# Patient Record
Sex: FEMALE | Race: BLACK OR AFRICAN AMERICAN | NOT HISPANIC OR LATINO | URBAN - METROPOLITAN AREA
[De-identification: names, ages, dates, MRNs, and addresses within clinical notes are randomized per-mention and may not be internally consistent; named-entity substitution may affect disease eponyms.]

---

## 2024-03-05 ENCOUNTER — HOSPITAL ENCOUNTER (EMERGENCY)
Facility: HOSPITAL | Age: 34
Discharge: HOME | End: 2024-03-05
Payer: COMMERCIAL

## 2024-03-05 VITALS
RESPIRATION RATE: 17 BRPM | HEIGHT: 66 IN | SYSTOLIC BLOOD PRESSURE: 124 MMHG | BODY MASS INDEX: 22.5 KG/M2 | DIASTOLIC BLOOD PRESSURE: 84 MMHG | OXYGEN SATURATION: 98 % | HEART RATE: 91 BPM | TEMPERATURE: 99 F | WEIGHT: 140 LBS

## 2024-03-05 DIAGNOSIS — Z20.2 POSSIBLE EXPOSURE TO STD: ICD-10-CM

## 2024-03-05 DIAGNOSIS — N76.0 BACTERIAL VAGINOSIS: ICD-10-CM

## 2024-03-05 DIAGNOSIS — N64.4 BREAST TENDERNESS IN FEMALE: Primary | ICD-10-CM

## 2024-03-05 DIAGNOSIS — B96.89 BACTERIAL VAGINOSIS: ICD-10-CM

## 2024-03-05 LAB
APPEARANCE UR: ABNORMAL
BILIRUB UR STRIP.AUTO-MCNC: NEGATIVE MG/DL
CLUE CELLS SPEC QL WET PREP: PRESENT
COLOR UR: YELLOW
GLUCOSE UR STRIP.AUTO-MCNC: NEGATIVE MG/DL
HCG UR QL IA.RAPID: NEGATIVE
KETONES UR STRIP.AUTO-MCNC: NEGATIVE MG/DL
LEUKOCYTE ESTERASE UR QL STRIP.AUTO: ABNORMAL
MUCOUS THREADS #/AREA URNS AUTO: NORMAL /LPF
NITRITE UR QL STRIP.AUTO: NEGATIVE
PH UR STRIP.AUTO: 7 [PH]
PROT UR STRIP.AUTO-MCNC: NEGATIVE MG/DL
RBC # UR STRIP.AUTO: NEGATIVE /UL
RBC #/AREA URNS AUTO: NORMAL /HPF
SP GR UR STRIP.AUTO: 1.01
SQUAMOUS #/AREA URNS AUTO: NORMAL /HPF
T VAGINALIS SPEC QL WET PREP: ABNORMAL
TRICHOMONAS REFLEX COMMENT: ABNORMAL
UROBILINOGEN UR STRIP.AUTO-MCNC: <2 MG/DL
WBC #/AREA URNS AUTO: NORMAL /HPF
WBC VAG QL WET PREP: ABNORMAL
YEAST VAG QL WET PREP: ABNORMAL

## 2024-03-05 PROCEDURE — 99283 EMERGENCY DEPT VISIT LOW MDM: CPT

## 2024-03-05 PROCEDURE — 2500000001 HC RX 250 WO HCPCS SELF ADMINISTERED DRUGS (ALT 637 FOR MEDICARE OP): Performed by: NURSE PRACTITIONER

## 2024-03-05 PROCEDURE — 87389 HIV-1 AG W/HIV-1&-2 AB AG IA: CPT | Mod: AHULAB | Performed by: NURSE PRACTITIONER

## 2024-03-05 PROCEDURE — 96372 THER/PROPH/DIAG INJ SC/IM: CPT

## 2024-03-05 PROCEDURE — 87661 TRICHOMONAS VAGINALIS AMPLIF: CPT | Mod: 59,AHULAB | Performed by: NURSE PRACTITIONER

## 2024-03-05 PROCEDURE — 81025 URINE PREGNANCY TEST: CPT | Performed by: NURSE PRACTITIONER

## 2024-03-05 PROCEDURE — 81003 URINALYSIS AUTO W/O SCOPE: CPT | Performed by: NURSE PRACTITIONER

## 2024-03-05 PROCEDURE — 87086 URINE CULTURE/COLONY COUNT: CPT | Mod: AHULAB | Performed by: NURSE PRACTITIONER

## 2024-03-05 PROCEDURE — 87210 SMEAR WET MOUNT SALINE/INK: CPT | Mod: 59 | Performed by: NURSE PRACTITIONER

## 2024-03-05 PROCEDURE — 87800 DETECT AGNT MULT DNA DIREC: CPT | Mod: AHULAB | Performed by: NURSE PRACTITIONER

## 2024-03-05 PROCEDURE — 2500000004 HC RX 250 GENERAL PHARMACY W/ HCPCS (ALT 636 FOR OP/ED): Performed by: NURSE PRACTITIONER

## 2024-03-05 PROCEDURE — 86780 TREPONEMA PALLIDUM: CPT | Mod: AHULAB | Performed by: NURSE PRACTITIONER

## 2024-03-05 PROCEDURE — 36415 COLL VENOUS BLD VENIPUNCTURE: CPT | Performed by: NURSE PRACTITIONER

## 2024-03-05 RX ORDER — METRONIDAZOLE 500 MG/1
500 TABLET ORAL ONCE
Status: COMPLETED | OUTPATIENT
Start: 2024-03-05 | End: 2024-03-05

## 2024-03-05 RX ORDER — DOXYCYCLINE HYCLATE 100 MG
TABLET ORAL
Status: DISCONTINUED
Start: 2024-03-05 | End: 2024-03-06 | Stop reason: HOSPADM

## 2024-03-05 RX ORDER — CEFTRIAXONE 500 MG/1
INJECTION, POWDER, FOR SOLUTION INTRAMUSCULAR; INTRAVENOUS
Status: DISCONTINUED
Start: 2024-03-05 | End: 2024-03-06 | Stop reason: HOSPADM

## 2024-03-05 RX ORDER — DOXYCYCLINE 100 MG/1
100 TABLET ORAL 2 TIMES DAILY
Qty: 14 TABLET | Refills: 0 | Status: SHIPPED | OUTPATIENT
Start: 2024-03-05 | End: 2024-03-12

## 2024-03-05 RX ORDER — METRONIDAZOLE 500 MG/1
500 TABLET ORAL 2 TIMES DAILY
Qty: 14 TABLET | Refills: 0 | Status: SHIPPED | OUTPATIENT
Start: 2024-03-05 | End: 2024-03-12

## 2024-03-05 RX ORDER — METRONIDAZOLE 500 MG/1
TABLET ORAL
Status: DISCONTINUED
Start: 2024-03-05 | End: 2024-03-06 | Stop reason: HOSPADM

## 2024-03-05 RX ORDER — DOXYCYCLINE HYCLATE 100 MG
100 TABLET ORAL ONCE
Status: COMPLETED | OUTPATIENT
Start: 2024-03-05 | End: 2024-03-05

## 2024-03-05 RX ORDER — CEFTRIAXONE 500 MG/1
500 INJECTION, POWDER, FOR SOLUTION INTRAMUSCULAR; INTRAVENOUS ONCE
Status: COMPLETED | OUTPATIENT
Start: 2024-03-05 | End: 2024-03-05

## 2024-03-05 RX ADMIN — CEFTRIAXONE SODIUM 500 MG: 500 INJECTION, POWDER, FOR SOLUTION INTRAMUSCULAR; INTRAVENOUS at 22:12

## 2024-03-05 RX ADMIN — METRONIDAZOLE 500 MG: 500 TABLET ORAL at 22:12

## 2024-03-05 RX ADMIN — DOXYCYCLINE HYCLATE 100 MG: 100 TABLET, COATED ORAL at 22:12

## 2024-03-05 ASSESSMENT — COLUMBIA-SUICIDE SEVERITY RATING SCALE - C-SSRS
1. IN THE PAST MONTH, HAVE YOU WISHED YOU WERE DEAD OR WISHED YOU COULD GO TO SLEEP AND NOT WAKE UP?: NO
2. HAVE YOU ACTUALLY HAD ANY THOUGHTS OF KILLING YOURSELF?: NO
6. HAVE YOU EVER DONE ANYTHING, STARTED TO DO ANYTHING, OR PREPARED TO DO ANYTHING TO END YOUR LIFE?: NO

## 2024-03-05 ASSESSMENT — PAIN DESCRIPTION - DESCRIPTORS: DESCRIPTORS: ACHING;BURNING

## 2024-03-05 ASSESSMENT — PAIN - FUNCTIONAL ASSESSMENT: PAIN_FUNCTIONAL_ASSESSMENT: 0-10

## 2024-03-05 ASSESSMENT — PAIN DESCRIPTION - LOCATION: LOCATION: OTHER (COMMENT)

## 2024-03-05 ASSESSMENT — PAIN SCALES - GENERAL: PAINLEVEL_OUTOF10: 7

## 2024-03-05 ASSESSMENT — PAIN DESCRIPTION - ORIENTATION: ORIENTATION: RIGHT

## 2024-03-05 ASSESSMENT — PAIN DESCRIPTION - PAIN TYPE: TYPE: ACUTE PAIN

## 2024-03-06 LAB
BACTERIA UR CULT: NORMAL
C TRACH RRNA SPEC QL NAA+PROBE: NEGATIVE
HIV 1+2 AB+HIV1 P24 AG SERPL QL IA: NONREACTIVE
N GONORRHOEA DNA SPEC QL PROBE+SIG AMP: NEGATIVE
T VAGINALIS RRNA SPEC QL NAA+PROBE: NEGATIVE
TREPONEMA PALLIDUM IGG+IGM AB [PRESENCE] IN SERUM OR PLASMA BY IMMUNOASSAY: NONREACTIVE

## 2024-03-06 NOTE — ED PROVIDER NOTES
"Emergency Department Encounter  [facility] EMERGENCY MEDICINE    Patient: Latoya Reddy  MRN: [id number]  : 1990  Date of Evaluation: 3/5/2024  ED Provider: FREDERIC Frank      Chief Complaint       Chief Complaint   Patient presents with    Breast Pain     Pt to ED for right nipple pain without swelling. No discharge from the nipple. Pt also would like to be tested for STD as she was given a heads up. She does not have any symptoms.      [place]    (Location/Symptom, Timing/Onset, Context/Setting, Quality, Duration, Modifying Factors, Severity) Note limiting factors.   Limitations to History: none  Historian: self  Records reviewed: EMR inpatient and outpatient notes, Care Everywhere      Latoya Reddy is a 33 y.o. female who presents to the emergency department complaining of right breast pain at the nipple line with no swelling, erythema, discharge ongoing for the last few weeks.  Does report that she has a history of nipple piercings and is unsure if this has to do with her pain.  Also would like to get tested for STDs but all of them\".  States that her previous sexual partner was having STD symptoms with burning and discharge and patient would like to get tested.  Patient denies any vaginal bleeding, vaginal discharge.  Denies any rashes, lesions.  Denies any lightheadedness, dizziness, syncope.  Denies any chance of pregnancy, last menstrual period was .    ROS:     Review of Systems  14 systems reviewed and otherwise acutely negative except as in the [place].          Past History     Past Medical History:   Diagnosis Date    18 weeks gestation of pregnancy 10/08/2018    18 weeks gestation of pregnancy    22 weeks gestation of pregnancy 2018    22 weeks gestation of pregnancy    31 weeks gestation of pregnancy 2019    31 weeks gestation of pregnancy    33 weeks gestation of pregnancy 01/15/2019    33 weeks gestation of pregnancy    36 weeks gestation of pregnancy " 02/06/2019    36 weeks gestation of pregnancy    Allergy status to unspecified drugs, medicaments and biological substances     History of seasonal allergies    Anemia complicating pregnancy, unspecified trimester 08/10/2017    Anemia in pregnancy    Displacement of intrauterine contraceptive device, initial encounter 07/30/2015    IUD threads lost    Encounter for contraceptive management, unspecified 09/28/2017    Contraception management    Encounter for gynecological examination (general) (routine) without abnormal findings     Well woman exam with routine gynecological exam    Encounter for preprocedural laboratory examination     Pre-operative laboratory examination    Encounter for removal of intrauterine contraceptive device 11/11/2015    Encounter for IUD removal    Encounter for routine postpartum follow-up 06/09/2015    Follow-up, postpartum, routine    Encounter for screening for infections with a predominantly sexual mode of transmission     Screen for sexually transmitted diseases    Encounter for supervision of normal pregnancy, unspecified, first trimester 02/23/2017    Encounter for pregnancy related examination in first trimester    Encounter for supervision of normal pregnancy, unspecified, first trimester 08/10/2017    Encounter for pregnancy related examination in first trimester    Encounter for supervision of normal pregnancy, unspecified, second trimester 08/03/2017    Encounter for pregnancy related examination in second trimester    Encounter for supervision of normal pregnancy, unspecified, unspecified trimester 09/10/2018    Encounter for supervision of normal pregnancy    Encounter for supervision of other normal pregnancy, first trimester     Encounter for supervision of other normal pregnancy in first trimester    Encounter for supervision of other normal pregnancy, second trimester 11/05/2018    Prenatal care, subsequent pregnancy in second trimester    Encounter for supervision of  other normal pregnancy, third trimester 01/15/2019    Prenatal care, subsequent pregnancy, third trimester    Encounter for surveillance of injectable contraceptive 2016    Encounter for Depo-Provera contraception    History of uterine scar from previous surgery 2019    History of uterine scar from previous surgery    Irregular menstruation, unspecified 2018    Missed menses    Maternal care for breech presentation, fetus 1 2017    Breech presentation, fetus 1    Other conditions influencing health status 2015    History of dyspareunia    Personal history of other drug therapy 10/08/2018    History of influenza vaccination    Personal history of other specified conditions     History of urinary frequency     Past Surgical History:   Procedure Laterality Date    OTHER SURGICAL HISTORY  2019    Tubal ligation bilateral    OTHER SURGICAL HISTORY  2019     section low transverse     Social History     Socioeconomic History    Marital status: Single     Spouse name: Not on file    Number of children: Not on file    Years of education: Not on file    Highest education level: Not on file   Occupational History    Not on file   Tobacco Use    Smoking status: Not on file    Smokeless tobacco: Not on file   Substance and Sexual Activity    Alcohol use: Not on file    Drug use: Not on file    Sexual activity: Not on file   Other Topics Concern    Not on file   Social History Narrative    Not on file     Social Determinants of Health     Financial Resource Strain: Not on file   Food Insecurity: Not on file   Transportation Needs: Not on file   Physical Activity: Not on file   Stress: Not on file   Social Connections: Not on file   Intimate Partner Violence: Not on file   Housing Stability: Not on file       Medications/Allergies     Previous Medications    No medications on file     No Known Allergies     Physical Exam       ED Triage Vitals [24 1915]   Temperature Heart  Rate Respirations BP   37.2 °C (99 °F) 99 18 118/80      Pulse Ox Temp Source Heart Rate Source Patient Position   100 % Oral -- Sitting      BP Location FiO2 (%)     Right arm --         Physical Exam    GENERAL:  The patient appears nourished and normally developed. Vital signs as documented.     HEENT:  Head normocephalic, atraumatic, EOMs intact, PERRLA, Mucous membranes moist. Nares patent without copious rhinorrhea.  No lymphadenopathy.    PULMONARY:  Lungs are clear to auscultation, without any respiratory distress. Able to speak full sentences, no accessory muscle use    CARDIAC:   Normal rate. No murmurs, rubs or gallops    ABDOMEN:  Soft, non distended, non tender, BS positive x 4 quadrants, No rebound or guarding, no peritoneal signs, no CVA tenderness, no masses or organomegaly      MUSCULOSKELETAL:   Able to ambulate, Non edematous, with no obvious deformities. Pulses intact distal    SKIN:   Good color, with no significant rashes.  No pallor.  Right nipple areola with mild tenderness, no erythema, no nipple discharge, no areas of fluctuance, palpable firmness noted at the areola at the 9 o'clock position    NEURO:  No obvious neurological deficits, normal sensation and strength bilaterally.  Able to follow commands, NIH 0, CN 2-12 intact.        Diagnostics   Labs:  Labs Reviewed   TRICHOMONAS WET PREP REFLEX TO PCR - Abnormal       Result Value    Trichomonas None Seen      Clue Cells Present (*)     Yeast None Seen      WBC 1-2      Trichomonas reflex comment        Value: Trichomonas was not seen by wet prep. Reflex Trichomonas vaginalis by Amplified Detection.   URINALYSIS WITH REFLEX CULTURE AND MICROSCOPIC - Abnormal    Color, Urine Yellow      Appearance, Urine Hazy (*)     Specific Gravity, Urine 1.012      pH, Urine 7.0      Protein, Urine NEGATIVE      Glucose, Urine NEGATIVE      Blood, Urine NEGATIVE      Ketones, Urine NEGATIVE      Bilirubin, Urine NEGATIVE      Urobilinogen, Urine <2.0    "   Nitrite, Urine NEGATIVE      Leukocyte Esterase, Urine TRACE (*)    HCG, URINE, QUALITATIVE - Normal    HCG, Urine NEGATIVE     URINE CULTURE   URINALYSIS WITH REFLEX CULTURE AND MICROSCOPIC    Narrative:     The following orders were created for panel order Urinalysis with Reflex Culture and Microscopic.  Procedure                               Abnormality         Status                     ---------                               -----------         ------                     Urinalysis with Reflex C...[[id number]]  Abnormal            Final result               Extra Urine Gray Tube[[id number]]                                                         Please view results for these tests on the individual orders.   MICROSCOPIC ONLY, URINE    WBC, Urine 1-5      RBC, Urine 1-2      Squamous Epithelial Cells, Urine 1-9 (SPARSE)      Mucus, Urine 1+     SYPHILIS SCREENING WITH REFLEX   HIV 1/2 ANTIGEN/ANTIBODY SCREEN WIH REFLEX TO CONFIRMATION   C. TRACHOMATIS + N. GONORRHOEAE, AMPLIFIED   TRICH VAGINALIS, AMPLIFIED     Radiographs:  No orders to display             Assessment   In brief, Latoya Reddy is a 33 y.o. female who presented to the emergency department for nipple pain as well as STD testing    Plan   Lab work    Differentials   Abscess  Cellulitis  Scar tissue  Cyst  Malignancy    ED Course     Diagnoses as of 03/05/24 2146   Breast tenderness in female   Bacterial vaginosis   Possible exposure to STD       Visit Vitals  /80 (BP Location: Right arm, Patient Position: Sitting)   Pulse 99   Temp 37.2 °C (99 °F) (Oral)   Resp 18   Ht 1.676 m (5' 6\")   Wt 63.5 kg (140 lb)   SpO2 100%   BMI 22.60 kg/m²   OB Status Having periods   BSA 1.72 m²       Medications   cefTRIAXone (Rocephin) vial 500 mg (has no administration in time range)   doxycycline (Vibra-Tabs) tablet 100 mg (has no administration in time range)   metroNIDAZOLE (Flagyl) tablet 500 mg (has no administration in time range)       Plan of care " discussed, patient is stable appearing, no signs of infectious etiology to the right breast but does have tenderness and flesh-colored area of firmness at the area of the lateral aspect of the areola on the right side.  Patient is concerned about exposure to STD, does not want the IM injection but will take the oral antibiotics that we will treat for chlamydia, doxycycline will also treat possible skin infection and may help with the breast tenderness if this is early infection.  Advised to follow-up with the breast clinic to schedule a mammogram and breast ultrasound.  Also advised to return for any worsening signs and symptoms.  Patient was also notified that we will call her for any positive results. Given rx for flagyl. Given rocephin      Final Impression      1. Breast tenderness in female    2. Bacterial vaginosis    3. Possible exposure to STD          DISPOSITION  Disposition: Discharge  Patient condition is: Stable    Comment: Please note this report has been produced using speech recognition software and may contain errors related to that system including errors in grammar, punctuation, and spelling, as well as words and phrases that may be inappropriate.  If there are any questions or concerns please feel free to contact the dictating provider for clarification.    FREDERIC Frank APRN-CNP  03/05/24 [id number]

## 2024-03-12 ENCOUNTER — APPOINTMENT (OUTPATIENT)
Dept: PRIMARY CARE | Facility: CLINIC | Age: 34
End: 2024-03-12
Payer: COMMERCIAL

## 2024-11-26 ENCOUNTER — OFFICE VISIT (OUTPATIENT)
Dept: URGENT CARE | Age: 34
End: 2024-11-26
Payer: COMMERCIAL

## 2024-11-26 VITALS
OXYGEN SATURATION: 99 % | SYSTOLIC BLOOD PRESSURE: 104 MMHG | HEIGHT: 67 IN | WEIGHT: 143.8 LBS | DIASTOLIC BLOOD PRESSURE: 69 MMHG | BODY MASS INDEX: 22.57 KG/M2 | HEART RATE: 64 BPM | TEMPERATURE: 98.2 F

## 2024-11-26 DIAGNOSIS — Z20.2 POSSIBLE EXPOSURE TO STI: Primary | ICD-10-CM

## 2024-11-26 LAB
POC APPEARANCE, URINE: CLEAR
POC BILIRUBIN, URINE: NEGATIVE
POC BLOOD, URINE: NEGATIVE
POC COLOR, URINE: YELLOW
POC GLUCOSE, URINE: NEGATIVE MG/DL
POC KETONES, URINE: NEGATIVE MG/DL
POC LEUKOCYTES, URINE: NEGATIVE
POC NITRITE,URINE: NEGATIVE
POC PH, URINE: 6 PH
POC PROTEIN, URINE: NEGATIVE MG/DL
POC SPECIFIC GRAVITY, URINE: 1.02
POC UROBILINOGEN, URINE: 0.2 EU/DL
PREGNANCY TEST URINE, POC: NEGATIVE

## 2024-11-26 PROCEDURE — 81025 URINE PREGNANCY TEST: CPT | Performed by: SPECIALIST

## 2024-11-26 PROCEDURE — 87661 TRICHOMONAS VAGINALIS AMPLIF: CPT

## 2024-11-26 PROCEDURE — 87491 CHLMYD TRACH DNA AMP PROBE: CPT

## 2024-11-26 PROCEDURE — 87591 N.GONORRHOEAE DNA AMP PROB: CPT

## 2024-11-26 PROCEDURE — 3008F BODY MASS INDEX DOCD: CPT | Performed by: SPECIALIST

## 2024-11-26 PROCEDURE — 81003 URINALYSIS AUTO W/O SCOPE: CPT | Performed by: SPECIALIST

## 2024-11-26 PROCEDURE — 99203 OFFICE O/P NEW LOW 30 MIN: CPT | Performed by: SPECIALIST

## 2024-11-26 ASSESSMENT — ENCOUNTER SYMPTOMS
RESPIRATORY NEGATIVE: 1
CONSTITUTIONAL NEGATIVE: 1

## 2024-11-26 NOTE — PROGRESS NOTES
Subjective   Patient ID: Latoya Reddy is a 34 y.o. female. They present today with a chief complaint of Exposure to STD (No symptoms, exposure to STI for a week. ).    History of Present Illness    Exposure to STD      Past Medical History  Allergies as of 11/26/2024    (No Known Allergies)       (Not in a hospital admission)       Past Medical History:   Diagnosis Date    18 weeks gestation of pregnancy ([facility]) 10/08/2018    18 weeks gestation of pregnancy    22 weeks gestation of pregnancy ([facility]) 11/05/2018    22 weeks gestation of pregnancy    31 weeks gestation of pregnancy ([facility]) 01/02/2019    31 weeks gestation of pregnancy    33 weeks gestation of pregnancy ([facility]) 01/15/2019    33 weeks gestation of pregnancy    36 weeks gestation of pregnancy ([facility]) 02/06/2019    36 weeks gestation of pregnancy    Allergy status to unspecified drugs, medicaments and biological substances     History of seasonal allergies    Anemia complicating pregnancy, unspecified trimester ([facility]) 08/10/2017    Anemia in pregnancy    Displacement of intrauterine contraceptive device, initial encounter 07/30/2015    IUD threads lost    Encounter for contraceptive management, unspecified 09/28/2017    Contraception management    Encounter for gynecological examination (general) (routine) without abnormal findings     Well woman exam with routine gynecological exam    Encounter for preprocedural laboratory examination     Pre-operative laboratory examination    Encounter for removal of intrauterine contraceptive device 11/11/2015    Encounter for IUD removal    Encounter for routine postpartum follow-up 06/09/2015    Follow-up, postpartum, routine    Encounter for screening for infections with a predominantly sexual mode of transmission     Screen for sexually transmitted diseases    Encounter for supervision of normal pregnancy, unspecified, first trimester 02/23/2017    Encounter for pregnancy related examination in  first trimester    Encounter for supervision of normal pregnancy, unspecified, first trimester 08/10/2017    Encounter for pregnancy related examination in first trimester    Encounter for supervision of normal pregnancy, unspecified, second trimester 2017    Encounter for pregnancy related examination in second trimester    Encounter for supervision of normal pregnancy, unspecified, unspecified trimester 09/10/2018    Encounter for supervision of normal pregnancy    Encounter for supervision of other normal pregnancy, first trimester     Encounter for supervision of other normal pregnancy in first trimester    Encounter for supervision of other normal pregnancy, second trimester 2018    Prenatal care, subsequent pregnancy in second trimester    Encounter for supervision of other normal pregnancy, third trimester 01/15/2019    Prenatal care, subsequent pregnancy, third trimester    Encounter for surveillance of injectable contraceptive 2016    Encounter for Depo-Provera contraception    History of uterine scar from previous surgery 2019    History of uterine scar from previous surgery    Irregular menstruation, unspecified 2018    Missed menses    Maternal care for breech presentation, fetus 1 2017    Breech presentation, fetus 1    Other conditions influencing health status 2015    History of dyspareunia    Personal history of other drug therapy 10/08/2018    History of influenza vaccination    Personal history of other specified conditions     History of urinary frequency       Past Surgical History:   Procedure Laterality Date    OTHER SURGICAL HISTORY  2019    Tubal ligation bilateral    OTHER SURGICAL HISTORY  2019     section low transverse        reports that she has been smoking cigarettes. She has never been exposed to tobacco smoke. She has never used smokeless tobacco. She reports current alcohol use. She reports that she does not use  "drugs.    Review of Systems  Review of Systems   Constitutional: Negative.    Respiratory: Negative.     Genitourinary: Negative.                                   Objective    Vitals:    11/26/24 0837   BP: 104/69   BP Location: Right arm   Patient Position: Sitting   BP Cuff Size: Adult   Pulse: 64   Temp: 36.8 °C (98.2 °F)   SpO2: 99%   Weight: 65.2 kg (143 lb 12.8 oz)   Height: 1.702 m (5' 7\")     Patient's last menstrual period was 10/28/2024 (approximate).    Physical Exam  Constitutional:       Appearance: Normal appearance.   Cardiovascular:      Rate and Rhythm: Normal rate and regular rhythm.   Abdominal:      General: Bowel sounds are normal.      Palpations: Abdomen is soft.   Neurological:      Mental Status: She is alert.         Procedures    Point of Care Test & Imaging Results from this visit  No results found for this visit on 11/26/24.   No results found.    Diagnostic study results (if any) were reviewed by Kiki May MD.    Assessment/Plan   Allergies, medications, history, and pertinent labs/EKGs/Imaging reviewed by Kiki May MD.     Medical Decision Making      Orders and Diagnoses  There are no diagnoses linked to this encounter.    Medical Admin Record      Patient disposition: Home    Electronically signed by Kiki May MD  8:49 AM      "

## 2024-11-27 LAB
C TRACH RRNA SPEC QL NAA+PROBE: NEGATIVE
N GONORRHOEA DNA SPEC QL PROBE+SIG AMP: NEGATIVE
T VAGINALIS RRNA SPEC QL NAA+PROBE: NEGATIVE

## 2025-02-21 ENCOUNTER — OFFICE VISIT (OUTPATIENT)
Dept: URGENT CARE | Age: 35
End: 2025-02-21
Payer: COMMERCIAL

## 2025-02-21 VITALS
WEIGHT: 143 LBS | BODY MASS INDEX: 22.4 KG/M2 | DIASTOLIC BLOOD PRESSURE: 65 MMHG | TEMPERATURE: 98.1 F | SYSTOLIC BLOOD PRESSURE: 103 MMHG

## 2025-02-21 DIAGNOSIS — T36.95XA ANTIBIOTIC-INDUCED YEAST INFECTION: ICD-10-CM

## 2025-02-21 DIAGNOSIS — B37.9 ANTIBIOTIC-INDUCED YEAST INFECTION: ICD-10-CM

## 2025-02-21 DIAGNOSIS — K04.7 DENTAL INFECTION: Primary | ICD-10-CM

## 2025-02-21 RX ORDER — FLUCONAZOLE 150 MG/1
150 TABLET ORAL ONCE
Qty: 1 TABLET | Refills: 0 | Status: SHIPPED | OUTPATIENT
Start: 2025-02-21 | End: 2025-02-21

## 2025-02-21 RX ORDER — AMOXICILLIN AND CLAVULANATE POTASSIUM 875; 125 MG/1; MG/1
875 TABLET, FILM COATED ORAL 2 TIMES DAILY
Qty: 20 TABLET | Refills: 0 | Status: SHIPPED | OUTPATIENT
Start: 2025-02-21

## 2025-02-21 RX ORDER — IBUPROFEN 800 MG/1
800 TABLET ORAL 3 TIMES DAILY PRN
Qty: 30 TABLET | Refills: 0 | Status: SHIPPED | OUTPATIENT
Start: 2025-02-21

## 2025-02-21 ASSESSMENT — ENCOUNTER SYMPTOMS
CARDIOVASCULAR NEGATIVE: 1
GASTROINTESTINAL NEGATIVE: 1
EYES NEGATIVE: 1
MUSCULOSKELETAL NEGATIVE: 1
ALLERGIC/IMMUNOLOGIC NEGATIVE: 1
PSYCHIATRIC NEGATIVE: 1
RESPIRATORY NEGATIVE: 1
FATIGUE: 1
HEMATOLOGIC/LYMPHATIC NEGATIVE: 1
NEUROLOGICAL NEGATIVE: 1
ENDOCRINE NEGATIVE: 1

## 2025-02-22 NOTE — PROGRESS NOTES
Subjective   Patient ID: Latoya Reddy is a 34 y.o. female. They present today with a chief complaint of Dental Pain (Day 3 - Toothache ).    History of Present Illness    History provided by:  Patient   used: No    Dental Pain  Location:  Upper  Upper teeth location:  10/MAKENNA lateral incisor  Quality:  Aching and burning  Severity:  Severe  Onset quality:  Gradual  Duration:  3 days  Timing:  Constant  Progression:  Worsening  Chronicity:  Recurrent  Context: abscess, cap fell off and filling fell out    Relieved by:  Nothing  Worsened by:  Hot food/drink  Risk factors: periodontal disease        Past Medical History  Allergies as of 02/21/2025    (No Known Allergies)       (Not in a hospital admission)       Past Medical History:   Diagnosis Date    18 weeks gestation of pregnancy ([facility]) 10/08/2018    18 weeks gestation of pregnancy    22 weeks gestation of pregnancy ([facility]) 11/05/2018    22 weeks gestation of pregnancy    31 weeks gestation of pregnancy ([facility]) 01/02/2019    31 weeks gestation of pregnancy    33 weeks gestation of pregnancy ([facility]) 01/15/2019    33 weeks gestation of pregnancy    36 weeks gestation of pregnancy ([facility]) 02/06/2019    36 weeks gestation of pregnancy    Allergy status to unspecified drugs, medicaments and biological substances     History of seasonal allergies    Anemia complicating pregnancy, unspecified trimester ([facility]) 08/10/2017    Anemia in pregnancy    Displacement of intrauterine contraceptive device, initial encounter 07/30/2015    IUD threads lost    Encounter for contraceptive management, unspecified 09/28/2017    Contraception management    Encounter for gynecological examination (general) (routine) without abnormal findings     Well woman exam with routine gynecological exam    Encounter for preprocedural laboratory examination     Pre-operative laboratory examination    Encounter for removal of intrauterine contraceptive device  11/11/2015    Encounter for IUD removal    Encounter for routine postpartum follow-up 06/09/2015    Follow-up, postpartum, routine    Encounter for screening for infections with a predominantly sexual mode of transmission     Screen for sexually transmitted diseases    Encounter for supervision of normal pregnancy, unspecified, first trimester 02/23/2017    Encounter for pregnancy related examination in first trimester    Encounter for supervision of normal pregnancy, unspecified, first trimester 08/10/2017    Encounter for pregnancy related examination in first trimester    Encounter for supervision of normal pregnancy, unspecified, second trimester 08/03/2017    Encounter for pregnancy related examination in second trimester    Encounter for supervision of normal pregnancy, unspecified, unspecified trimester 09/10/2018    Encounter for supervision of normal pregnancy    Encounter for supervision of other normal pregnancy, first trimester     Encounter for supervision of other normal pregnancy in first trimester    Encounter for supervision of other normal pregnancy, second trimester 11/05/2018    Prenatal care, subsequent pregnancy in second trimester    Encounter for supervision of other normal pregnancy, third trimester 01/15/2019    Prenatal care, subsequent pregnancy, third trimester    Encounter for surveillance of injectable contraceptive 04/20/2016    Encounter for Depo-Provera contraception    History of uterine scar from previous surgery 01/30/2019    History of uterine scar from previous surgery    Irregular menstruation, unspecified 08/13/2018    Missed menses    Maternal care for breech presentation, fetus 1 08/03/2017    Breech presentation, fetus 1    Other conditions influencing health status 07/30/2015    History of dyspareunia    Personal history of other drug therapy 10/08/2018    History of influenza vaccination    Personal history of other specified conditions     History of urinary frequency        Past Surgical History:   Procedure Laterality Date    OTHER SURGICAL HISTORY  2019    Tubal ligation bilateral    OTHER SURGICAL HISTORY  2019     section low transverse        reports that she has been smoking cigarettes. She has never been exposed to tobacco smoke. She has never used smokeless tobacco. She reports current alcohol use. She reports that she does not use drugs.    Review of Systems  Review of Systems   Constitutional:  Positive for fatigue.   HENT:  Positive for dental problem.    Eyes: Negative.    Respiratory: Negative.     Cardiovascular: Negative.    Gastrointestinal: Negative.    Endocrine: Negative.    Genitourinary: Negative.    Musculoskeletal: Negative.    Allergic/Immunologic: Negative.    Neurological: Negative.    Hematological: Negative.    Psychiatric/Behavioral: Negative.     All other systems reviewed and are negative.                                 Objective    Vitals:    25 0837   BP: 103/65   Temp: 36.7 °C (98.1 °F)   Weight: 64.9 kg (143 lb)     No LMP recorded.    Physical Exam  Vitals and nursing note reviewed.   Constitutional:       Appearance: Normal appearance. She is normal weight.   HENT:      Mouth/Throat:      Mouth: Mucous membranes are moist.      Dentition: Dental tenderness, gingival swelling, dental caries and dental abscesses present.      Pharynx: Oropharynx is clear.     Cardiovascular:      Rate and Rhythm: Normal rate and regular rhythm.      Pulses: Normal pulses.      Heart sounds: Normal heart sounds.   Pulmonary:      Effort: Pulmonary effort is normal.   Abdominal:      General: Bowel sounds are normal.   Skin:     General: Skin is warm.   Neurological:      General: No focal deficit present.      Mental Status: She is alert. Mental status is at baseline.   Psychiatric:         Mood and Affect: Mood normal.         Behavior: Behavior normal.         Thought Content: Thought content normal.         Judgment: Judgment normal.          Procedures    Point of Care Test & Imaging Results from this visit  No results found for this visit on 02/21/25.   No results found.    Diagnostic study results (if any) were reviewed by FREDERIC Pascal.    Assessment/Plan   Allergies, medications, history, and pertinent labs/EKGs/Imaging reviewed by FREDERIC Pascal.     Medical Decision Making  Medical Decision Making  At time of discharge patient was clinically well-appearing and HDS for outpatient management. The patient and/or family was educated regarding diagnosis, supportive care, OTC and Rx medications. The patient and/or family was given the opportunity to ask questions prior to discharge.  They verbalized understanding of my discussion of the plans for treatment, expected course, indications to return to  or seek further evaluation in ED, and the need for timely follow up as directed.   They were provided with a work/school excuse if requested.        Orders and Diagnoses  Diagnoses and all orders for this visit:  Dental infection  -     amoxicillin-pot clavulanate (Augmentin) 875-125 mg tablet; Take 1 tablet (875 mg) by mouth 2 times a day.  -     ibuprofen 800 mg tablet; Take 1 tablet (800 mg) by mouth 3 times a day as needed for mild pain (1 - 3) or moderate pain (4 - 6) (pain) for up to 30 doses.  Antibiotic-induced yeast infection  -     fluconazole (Diflucan) 150 mg tablet; Take 1 tablet (150 mg) by mouth 1 time for 1 dose.    Has dental appt scheduled    Return to Urgent care if symptoms return or progress  Follow up with PCP in 1-2 weeks       Patient disposition: Home    Electronically signed by FREDERIC Pascal  8:15 PM

## 2025-06-02 ENCOUNTER — OFFICE VISIT (OUTPATIENT)
Dept: URGENT CARE | Age: 35
End: 2025-06-02
Payer: COMMERCIAL

## 2025-06-02 VITALS
SYSTOLIC BLOOD PRESSURE: 109 MMHG | HEART RATE: 74 BPM | WEIGHT: 143 LBS | OXYGEN SATURATION: 100 % | RESPIRATION RATE: 18 BRPM | DIASTOLIC BLOOD PRESSURE: 76 MMHG | BODY MASS INDEX: 22.4 KG/M2 | TEMPERATURE: 98.1 F

## 2025-06-02 DIAGNOSIS — T36.95XA ANTIBIOTIC-INDUCED YEAST INFECTION: ICD-10-CM

## 2025-06-02 DIAGNOSIS — B37.9 ANTIBIOTIC-INDUCED YEAST INFECTION: ICD-10-CM

## 2025-06-02 DIAGNOSIS — N39.0 URINARY TRACT INFECTION WITH HEMATURIA, SITE UNSPECIFIED: Primary | ICD-10-CM

## 2025-06-02 DIAGNOSIS — R31.9 URINARY TRACT INFECTION WITH HEMATURIA, SITE UNSPECIFIED: Primary | ICD-10-CM

## 2025-06-02 DIAGNOSIS — Z11.3 SCREENING FOR STD (SEXUALLY TRANSMITTED DISEASE): ICD-10-CM

## 2025-06-02 DIAGNOSIS — R35.89 POLYURIA: ICD-10-CM

## 2025-06-02 LAB
CHLAMYDIA TRACHOMATIS: NOT DETECTED
NEISSERIA GONORRHOEAE: NOT DETECTED
POC APPEARANCE, URINE: ABNORMAL
POC BILIRUBIN, URINE: NEGATIVE
POC BLOOD, URINE: ABNORMAL
POC COLOR, URINE: ABNORMAL
POC GLUCOSE, URINE: NEGATIVE MG/DL
POC KETONES, URINE: NEGATIVE MG/DL
POC LEUKOCYTES, URINE: ABNORMAL
POC NITRITE,URINE: NEGATIVE
POC PH, URINE: 6 PH
POC PROTEIN, URINE: ABNORMAL MG/DL
POC SPECIFIC GRAVITY, URINE: 1.01
POC TRICHOMONAS: NEGATIVE
POC UROBILINOGEN, URINE: 0.2 EU/DL
PREGNANCY TEST URINE, POC: NEGATIVE

## 2025-06-02 RX ORDER — CIPROFLOXACIN 500 MG/1
500 TABLET, FILM COATED ORAL 2 TIMES DAILY
Qty: 20 TABLET | Refills: 0 | Status: SHIPPED | OUTPATIENT
Start: 2025-06-02 | End: 2025-06-12

## 2025-06-02 RX ORDER — FLUCONAZOLE 150 MG/1
150 TABLET ORAL ONCE
Qty: 1 TABLET | Refills: 0 | Status: SHIPPED | OUTPATIENT
Start: 2025-06-02 | End: 2025-06-02

## 2025-06-02 RX ORDER — METRONIDAZOLE 500 MG/1
TABLET ORAL
COMMUNITY

## 2025-06-02 ASSESSMENT — ENCOUNTER SYMPTOMS
ABDOMINAL PAIN: 1
NEUROLOGICAL NEGATIVE: 1
MUSCULOSKELETAL NEGATIVE: 1
ENDOCRINE NEGATIVE: 1
DYSURIA: 1
HEMATOLOGIC/LYMPHATIC NEGATIVE: 1
ALLERGIC/IMMUNOLOGIC NEGATIVE: 1
PSYCHIATRIC NEGATIVE: 1
EYES NEGATIVE: 1
FATIGUE: 1
RESPIRATORY NEGATIVE: 1

## 2025-06-03 NOTE — PROGRESS NOTES
Subjective   Patient ID: Latoya Reddy is a 34 y.o. female. They present today with a chief complaint of Urinary Problem (1 week- Polyuria).    History of Present Illness    History provided by:  Patient   used: No    Difficulty Urinating  Pain quality:  Aching  Pain severity:  Moderate  Onset quality:  Gradual  Duration:  1 week  Timing:  Constant  Progression:  Worsening  Chronicity:  New  Recent urinary tract infections: yes    Relieved by:  Acetaminophen  Urinary symptoms: discolored urine    Associated symptoms: abdominal pain        Past Medical History  Allergies as of 06/02/2025    (No Known Allergies)       Prescriptions Prior to Admission[1]     Medical History[2]    Surgical History[3]     reports that she has been smoking cigarettes. She has never been exposed to tobacco smoke. She has never used smokeless tobacco. She reports current alcohol use. She reports that she does not use drugs.    Review of Systems  Review of Systems   Constitutional:  Positive for fatigue.   Eyes: Negative.    Respiratory: Negative.     Gastrointestinal:  Positive for abdominal pain.   Endocrine: Negative.    Genitourinary:  Positive for dysuria and urgency.   Musculoskeletal: Negative.    Skin: Negative.    Allergic/Immunologic: Negative.    Neurological: Negative.    Hematological: Negative.    Psychiatric/Behavioral: Negative.     All other systems reviewed and are negative.                                 Objective    Vitals:    06/02/25 0825   BP: 109/76   Pulse: 74   Resp: 18   Temp: 36.7 °C (98.1 °F)   SpO2: 100%   Weight: 64.9 kg (143 lb)     Patient's last menstrual period was 05/08/2025 (exact date).    Physical Exam  Vitals and nursing note reviewed.   Constitutional:       Appearance: Normal appearance. She is normal weight.   Cardiovascular:      Rate and Rhythm: Normal rate and regular rhythm.      Pulses: Normal pulses.      Heart sounds: Normal heart sounds.   Abdominal:      Tenderness:  There is abdominal tenderness (+ suprapubic tenderness).   Musculoskeletal:         General: Normal range of motion.   Skin:     General: Skin is warm and dry.   Neurological:      General: No focal deficit present.      Mental Status: She is alert and oriented to person, place, and time. Mental status is at baseline.   Psychiatric:         Mood and Affect: Mood normal.         Behavior: Behavior normal.         Thought Content: Thought content normal.         Judgment: Judgment normal.         Procedures    Point of Care Test & Imaging Results from this visit  Results for orders placed or performed in visit on 06/02/25   POCT pregnancy, urine manually resulted   Result Value Ref Range    Preg Test, Ur Negative Negative   POCT UA Automated manually resulted   Result Value Ref Range    POC Color, Urine Lalitha (A) Straw, Yellow, Light-Yellow    POC Appearance, Urine Cloudy (A) Clear    POC Glucose, Urine NEGATIVE NEGATIVE mg/dl    POC Bilirubin, Urine NEGATIVE NEGATIVE    POC Ketones, Urine NEGATIVE NEGATIVE mg/dl    POC Specific Gravity, Urine 1.015 1.005 - 1.035    POC Blood, Urine SMALL (1+) (A) NEGATIVE    POC PH, Urine 6.0 No Reference Range Established PH    POC Protein, Urine TRACE (A) NEGATIVE mg/dl    POC Urobilinogen, Urine 0.2 0.2, 1.0 EU/DL    Poc Nitrite, Urine NEGATIVE NEGATIVE    POC Leukocytes, Urine LARGE (3+) (A) NEGATIVE   POCT WS CHLAMYDIA GONORRHEA PCR BINX manually resulted   Result Value Ref Range    Chlamydia Trachomatis Not Detected Not Detected    Neisseria Gonorrhoeae Not Detected Not Detected   POCT trichomonas manually resulted   Result Value Ref Range    POC Trichomonas Negative Negative      Imaging  No results found.    Cardiology, Vascular, and Other Imaging  No other imaging results found for the past 2 days      Diagnostic study results (if any) were reviewed by FREDERIC Pascal.    Assessment/Plan   Allergies, medications, history, and pertinent labs/EKGs/Imaging reviewed  by FREDERIC Pascal.     Medical Decision Making  Medical Decision Making  At time of discharge patient was clinically well-appearing and HDS for outpatient management. The patient and/or family was educated regarding diagnosis, supportive care, OTC and Rx medications. The patient and/or family was given the opportunity to ask questions prior to discharge.  They verbalized understanding of my discussion of the plans for treatment, expected course, indications to return to  or seek further evaluation in ED, and the need for timely follow up as directed.   They were provided with a work/school excuse if requested.        Orders and Diagnoses  Diagnoses and all orders for this visit:  Urinary tract infection with hematuria, site unspecified  -     ciprofloxacin (Cipro) 500 mg tablet; Take 1 tablet (500 mg) by mouth 2 times a day for 10 days.  Polyuria  -     POCT pregnancy, urine manually resulted  -     POCT UA Automated manually resulted  -     POCT WS CHLAMYDIA GONORRHEA PCR BINX manually resulted  -     POCT trichomonas manually resulted  Screening for STD (sexually transmitted disease)  -     POCT pregnancy, urine manually resulted  -     POCT UA Automated manually resulted  -     POCT WS CHLAMYDIA GONORRHEA PCR BINX manually resulted  -     POCT trichomonas manually resulted  Antibiotic-induced yeast infection  -     fluconazole (Diflucan) 150 mg tablet; Take 1 tablet (150 mg) by mouth 1 time for 1 dose.      Return to Urgent care if symptoms return or progress  Follow up with PCP in 1-2 weeks       Patient disposition: Home    Electronically signed by FREDERIC Pascal  8:55 PM           [1] (Not in a hospital admission)  [2]   Past Medical History:  Diagnosis Date    18 weeks gestation of pregnancy ([facility]) 10/08/2018    18 weeks gestation of pregnancy    22 weeks gestation of pregnancy ([facility]) 11/05/2018    22 weeks gestation of pregnancy    31 weeks gestation of pregnancy ([facility])  01/02/2019    31 weeks gestation of pregnancy    33 weeks gestation of pregnancy ([facility]) 01/15/2019    33 weeks gestation of pregnancy    36 weeks gestation of pregnancy ([facility]) 02/06/2019    36 weeks gestation of pregnancy    Allergy status to unspecified drugs, medicaments and biological substances     History of seasonal allergies    Anemia complicating pregnancy, unspecified trimester 08/10/2017    Anemia in pregnancy    Displacement of intrauterine contraceptive device, initial encounter 07/30/2015    IUD threads lost    Encounter for contraceptive management, unspecified 09/28/2017    Contraception management    Encounter for gynecological examination (general) (routine) without abnormal findings     Well woman exam with routine gynecological exam    Encounter for preprocedural laboratory examination     Pre-operative laboratory examination    Encounter for removal of intrauterine contraceptive device 11/11/2015    Encounter for IUD removal    Encounter for routine postpartum follow-up 06/09/2015    Follow-up, postpartum, routine    Encounter for screening for infections with a predominantly sexual mode of transmission     Screen for sexually transmitted diseases    Encounter for supervision of normal pregnancy, unspecified, first trimester 02/23/2017    Encounter for pregnancy related examination in first trimester    Encounter for supervision of normal pregnancy, unspecified, first trimester 08/10/2017    Encounter for pregnancy related examination in first trimester    Encounter for supervision of normal pregnancy, unspecified, second trimester 08/03/2017    Encounter for pregnancy related examination in second trimester    Encounter for supervision of normal pregnancy, unspecified, unspecified trimester 09/10/2018    Encounter for supervision of normal pregnancy    Encounter for supervision of other normal pregnancy, first trimester     Encounter for supervision of other normal pregnancy in first  trimester    Encounter for supervision of other normal pregnancy, second trimester 2018    Prenatal care, subsequent pregnancy in second trimester    Encounter for supervision of other normal pregnancy, third trimester 01/15/2019    Prenatal care, subsequent pregnancy, third trimester    Encounter for surveillance of injectable contraceptive 2016    Encounter for Depo-Provera contraception    History of uterine scar from previous surgery 2019    History of uterine scar from previous surgery    Irregular menstruation, unspecified 2018    Missed menses    Maternal care for breech presentation, fetus 1 2017    Breech presentation, fetus 1    Other conditions influencing health status 2015    History of dyspareunia    Personal history of other drug therapy 10/08/2018    History of influenza vaccination    Personal history of other specified conditions     History of urinary frequency   [3]   Past Surgical History:  Procedure Laterality Date    OTHER SURGICAL HISTORY  2019    Tubal ligation bilateral    OTHER SURGICAL HISTORY  2019     section low transverse

## 2025-08-12 ENCOUNTER — OFFICE VISIT (OUTPATIENT)
Dept: URGENT CARE | Age: 35
End: 2025-08-12
Payer: COMMERCIAL

## 2025-08-12 VITALS
OXYGEN SATURATION: 98 % | SYSTOLIC BLOOD PRESSURE: 109 MMHG | DIASTOLIC BLOOD PRESSURE: 76 MMHG | BODY MASS INDEX: 22.4 KG/M2 | TEMPERATURE: 99.1 F | WEIGHT: 143 LBS | RESPIRATION RATE: 18 BRPM | HEART RATE: 86 BPM

## 2025-08-12 DIAGNOSIS — J02.0 STREP PHARYNGITIS: Primary | ICD-10-CM

## 2025-08-12 DIAGNOSIS — J02.9 SORE THROAT: ICD-10-CM

## 2025-08-12 LAB
POC HUMAN RHINOVIRUS PCR: NEGATIVE
POC INFLUENZA A VIRUS PCR: NEGATIVE
POC INFLUENZA B VIRUS PCR: NEGATIVE
POC RESPIRATORY SYNCYTIAL VIRUS PCR: NEGATIVE
POC STREPTOCOCCUS PYOGENES (GROUP A STREP) PCR: POSITIVE

## 2025-08-12 PROCEDURE — 87631 RESP VIRUS 3-5 TARGETS: CPT | Performed by: EMERGENCY MEDICINE

## 2025-08-12 PROCEDURE — 99213 OFFICE O/P EST LOW 20 MIN: CPT | Performed by: EMERGENCY MEDICINE

## 2025-08-12 PROCEDURE — 87651 STREP A DNA AMP PROBE: CPT | Performed by: EMERGENCY MEDICINE

## 2025-08-12 RX ORDER — AMOXICILLIN AND CLAVULANATE POTASSIUM 875; 125 MG/1; MG/1
875 TABLET, FILM COATED ORAL 2 TIMES DAILY
Qty: 20 TABLET | Refills: 0 | Status: SHIPPED | OUTPATIENT
Start: 2025-08-12

## 2025-08-12 ASSESSMENT — ENCOUNTER SYMPTOMS: SORE THROAT: 1
